# Patient Record
Sex: FEMALE | Race: WHITE | Employment: FULL TIME | URBAN - METROPOLITAN AREA
[De-identification: names, ages, dates, MRNs, and addresses within clinical notes are randomized per-mention and may not be internally consistent; named-entity substitution may affect disease eponyms.]

---

## 2020-03-25 ENCOUNTER — HOSPITAL ENCOUNTER (OUTPATIENT)
Age: 40
Discharge: HOME | End: 2020-03-27
Payer: MEDICAID

## 2020-03-25 ENCOUNTER — TELEPHONE (OUTPATIENT)
Dept: PRIMARY CARE CLINIC | Age: 40
End: 2020-03-25

## 2020-03-25 ENCOUNTER — HOSPITAL ENCOUNTER (OUTPATIENT)
Dept: GENERAL RADIOLOGY | Age: 40
Discharge: HOME | End: 2020-03-27
Payer: MEDICAID

## 2020-03-25 ENCOUNTER — NURSE TRIAGE (OUTPATIENT)
Dept: OTHER | Facility: CLINIC | Age: 40
End: 2020-03-25

## 2020-03-25 ENCOUNTER — TELEPHONE (OUTPATIENT)
Dept: ADMINISTRATIVE | Age: 40
End: 2020-03-25

## 2020-03-25 ENCOUNTER — OFFICE VISIT (OUTPATIENT)
Dept: PRIMARY CARE CLINIC | Age: 40
End: 2020-03-25
Payer: MEDICAID

## 2020-03-25 VITALS
HEIGHT: 66 IN | DIASTOLIC BLOOD PRESSURE: 70 MMHG | BODY MASS INDEX: 32.95 KG/M2 | WEIGHT: 205 LBS | SYSTOLIC BLOOD PRESSURE: 110 MMHG | TEMPERATURE: 98.9 F | RESPIRATION RATE: 18 BRPM | OXYGEN SATURATION: 97 % | HEART RATE: 98 BPM

## 2020-03-25 LAB
INFLUENZA A ANTIGEN, POC: NORMAL
INFLUENZA B ANTIGEN, POC: NORMAL

## 2020-03-25 PROCEDURE — 87804 INFLUENZA ASSAY W/OPTIC: CPT | Performed by: PHYSICIAN ASSISTANT

## 2020-03-25 PROCEDURE — 99213 OFFICE O/P EST LOW 20 MIN: CPT | Performed by: PHYSICIAN ASSISTANT

## 2020-03-25 PROCEDURE — 71046 X-RAY EXAM CHEST 2 VIEWS: CPT

## 2020-03-25 RX ORDER — AZITHROMYCIN 250 MG/1
250 TABLET, FILM COATED ORAL SEE ADMIN INSTRUCTIONS
Qty: 6 TABLET | Refills: 0 | Status: SHIPPED | OUTPATIENT
Start: 2020-03-25 | End: 2020-03-30

## 2020-03-25 RX ORDER — ALBUTEROL SULFATE 90 UG/1
2 AEROSOL, METERED RESPIRATORY (INHALATION) EVERY 6 HOURS PRN
Qty: 1 INHALER | Refills: 3 | Status: SHIPPED | OUTPATIENT
Start: 2020-03-25

## 2020-03-25 RX ORDER — METFORMIN HYDROCHLORIDE 750 MG/1
TABLET, EXTENDED RELEASE ORAL
COMMUNITY
Start: 2020-03-02 | End: 2022-08-28

## 2020-03-25 RX ORDER — PREDNISONE 20 MG/1
40 TABLET ORAL DAILY
Qty: 10 TABLET | Refills: 0 | Status: SHIPPED | OUTPATIENT
Start: 2020-03-25 | End: 2020-03-30

## 2020-03-25 RX ORDER — VARENICLINE TARTRATE 1 MG/1
TABLET, FILM COATED ORAL
COMMUNITY
Start: 2020-02-28

## 2020-03-25 NOTE — LETTER
[address] [place] [address] [facility] [address]  Phone: [phone]  Fax: [phone]    Alyse La        March 25, 2020     Patient: Diamond Alvarez   YOB: 1980   Date of Visit: 3/25/2020       To Whom It May Concern: It is my medical opinion that Diamond Alvarez needs to be self quarantined for 14 days due to current illness. .    If you have any questions or concerns, please don't hesitate to call.     Sincerely,        Aysha Rodriguez PA-C

## 2020-03-25 NOTE — PATIENT INSTRUCTIONS
Patient Education        Cough: Care Instructions  Your Care Instructions    A cough is your body's response to something that bothers your throat or airways. Many things can cause a cough. You might cough because of a cold or the flu, bronchitis, or asthma. Smoking, postnasal drip, allergies, and stomach acid that backs up into your throat also can cause coughs. A cough is a symptom, not a disease. Most coughs stop when the cause, such as a cold, goes away. You can take a few steps at home to cough less and feel better. Follow-up care is a key part of your treatment and safety. Be sure to make and go to all appointments, and call your doctor if you are having problems. It's also a good idea to know your test results and keep a list of the medicines you take. How can you care for yourself at home? · Drink lots of water and other fluids. This helps thin the mucus and soothes a dry or sore throat. Honey or lemon juice in hot water or tea may ease a dry cough. · Take cough medicine as directed by your doctor. · Prop up your head on pillows to help you breathe and ease a dry cough. · Try cough drops to soothe a dry or sore throat. Cough drops don't stop a cough. Medicine-flavored cough drops are no better than candy-flavored drops or hard candy. · Do not smoke. Avoid secondhand smoke. If you need help quitting, talk to your doctor about stop-smoking programs and medicines. These can increase your chances of quitting for good. When should you call for help? Call 911 anytime you think you may need emergency care.  For example, call if:    · You have severe trouble breathing.    Call your doctor now or seek immediate medical care if:    · You cough up blood.     · You have new or worse trouble breathing.     · You have a new or higher fever.     · You have a new rash.    Watch closely for changes in your health, and be sure to contact your doctor if:    · You cough more deeply or more often, especially if you notice more mucus or a change in the color of your mucus.     · You have new symptoms, such as a sore throat, an earache, or sinus pain.     · You do not get better as expected. Where can you learn more? Go to https://chpekinjal.[employer]. org and sign in to your Startupe[employer] account. Enter D279 in the [facility] box to learn more about \"Cough: Care Instructions. \"     If you do not have an account, please click on the \"Sign Up Now\" link. Current as of: June 9, 2019Content Version: 12.4  © 1599-1319 Healthwise, Incorporated. Care instructions adapted under license by [facility] ([facility]). If you have questions about a medical condition or this instruction, always ask your healthcare professional. [address] any warranty or liability for your use of this information. The results and recommendations made for you today reflect your status at this time. Your condition is subject to change. Should your symptoms worsen, please seek additional medical attention. Preventing the Spread of Coronavirus Disease 2019 in Homes and Residential Communities   For the most recent information go to IndyGeek.fi    Prevention steps for People with confirmed or suspected COVID-19 (including persons under investigation) who do not need to be hospitalized  and   People with confirmed COVID-19 who were hospitalized and determined to be medically stable to go home    Your healthcare provider and public health staff will evaluate whether you can be cared for at home. If it is determined that you do not need to be hospitalized and can be isolated at home, you will be monitored by staff from your local or state health department. You should follow the prevention steps below until a healthcare provider or local or state health department says you can return to your normal activities.   Stay home except to get medical care  People who are mildly ill with COVID-19 are able to isolate at home during their illness. You should restrict activities outside your home, except for getting medical care. Do not go to work, school, or public areas. Avoid using public transportation, ride-sharing, or taxis. Separate yourself from other people and animals in your home  People: As much as possible, you should stay in a specific room and away from other people in your home. Also, you should use a separate bathroom, if available. Animals: You should restrict contact with pets and other animals while you are sick with COVID-19, just like you would around other people. Although there have not been reports of pets or other animals becoming sick with COVID-19, it is still recommended that people sick with COVID-19 limit contact with animals until more information is known about the virus. When possible, have another member of your household care for your animals while you are sick. If you are sick with COVID-19, avoid contact with your pet, including petting, snuggling, being kissed or licked, and sharing food. If you must care for your pet or be around animals while you are sick, wash your hands before and after you interact with pets and wear a facemask. Call ahead before visiting your doctor  If you have a medical appointment, call the healthcare provider and tell them that you have or may have COVID-19. This will help the healthcare providers office take steps to keep other people from getting infected or exposed. Wear a facemask  You should wear a facemask when you are around other people (e.g., sharing a room or vehicle) or pets and before you enter a healthcare providers office. If you are not able to wear a facemask (for example, because it causes trouble breathing), then people who live with you should not stay in the same room with you, or they should wear a facemask if they enter your room.   Cover your coughs and sneezes  Cover your mouth and nose with a tissue when you cough or sneeze. Throw used tissues in a lined trash can. Immediately wash your hands with soap and water for at least 20 seconds or, if soap and water are not available, clean your hands with an alcohol-based hand  that contains at least 60% alcohol. Clean your hands often  Wash your hands often with soap and water for at least 20 seconds, especially after blowing your nose, coughing, or sneezing; going to the bathroom; and before eating or preparing food. If soap and water are not readily available, use an alcohol-based hand  with at least 60% alcohol, covering all surfaces of your hands and rubbing them together until they feel dry. Soap and water are the best option if hands are visibly dirty. Avoid touching your eyes, nose, and mouth with unwashed hands. Avoid sharing personal household items  You should not share dishes, drinking glasses, cups, eating utensils, towels, or bedding with other people or pets in your home. After using these items, they should be washed thoroughly with soap and water. Clean all high-touch surfaces everyday  High touch surfaces include counters, tabletops, doorknobs, bathroom fixtures, toilets, phones, keyboards, tablets, and bedside tables. Also, clean any surfaces that may have blood, stool, or body fluids on them. Use a household cleaning spray or wipe, according to the label instructions. Labels contain instructions for safe and effective use of the cleaning product including precautions you should take when applying the product, such as wearing gloves and making sure you have good ventilation during use of the product. Monitor your symptoms  Seek prompt medical attention if your illness is worsening (e.g., difficulty breathing). Before seeking care, call your healthcare provider and tell them that you have, or are being evaluated for, COVID-19. Put on a facemask before you enter the facility.  These steps will help the healthcare providers office to keep other people in the office or waiting room from getting infected or exposed. Ask your healthcare provider to call the local or state health department. Persons who are placed under active monitoring or facilitated self-monitoring should follow instructions provided by their local health department or occupational health professionals, as appropriate. When working with your local health department check their available hours. If you have a medical emergency and need to call 911, notify the dispatch personnel that you have, or are being evaluated for COVID-19. If possible, put on a facemask before emergency medical services arrive. Discontinuing home isolation  Patients with confirmed COVID-19 should remain under home isolation precautions until the risk of secondary transmission to others is thought to be low. The decision to discontinue home isolation precautions should be made on a case-by-case basis, in consultation with healthcare providers and state and [facility] health departments. Patient Education        Reactive Airway Disease: Care Instructions  Your Care Instructions    Reactive airway disease is a breathing problem that appears as wheezing, a whistling noise in your airways. It may be caused by a viral or bacterial infection, allergies, tobacco smoke, or something else in the environment. When you are around these triggers, your body releases chemicals that make the airways get tight. Reactive airway disease is a lot like asthma. Both can cause wheezing. But asthma is ongoing, while reactive airway disease may occur only now and then. Tests can be done to tell whether you have asthma. You may take the same medicines used to treat asthma. Good home care and follow-up care with your doctor can help you recover. Follow-up care is a key part of your treatment and safety. Be sure to make and go to all appointments, and call your doctor if you are having problems.  It's also a good idea to know your test results and keep a list of the medicines you take. How can you care for yourself at home? · Take your medicines exactly as prescribed. Call your doctor if you think you are having a problem with your medicine. · Do not smoke or allow others to smoke around you. If you need help quitting, talk to your doctor about stop-smoking programs and medicines. These can increase your chances of quitting for good. · If you know what caused your wheezing (such as perfume or the odor of household chemicals), try to avoid it in the future. · Wash your hands several times a day, and consider using hand gels or wipes that contain alcohol. This can prevent colds and other infections. When should you call for help? Call 911 anytime you think you may need emergency care. For example, call if:    · You have severe trouble breathing.    Watch closely for changes in your health, and be sure to contact your doctor if:    · You cough up yellow, dark brown, or bloody mucus.     · You have a fever.     · Your wheezing gets worse. Where can you learn more? Go to https://Wo[employer] BrospeMobstats.Skyword. org and sign in to your C[employer]ogen account. Enter P438 in the [facility] box to learn more about \"Reactive Airway Disease: Care Instructions. \"     If you do not have an account, please click on the \"Sign Up Now\" link. Current as of: June 9, 2019Content Version: 12.4  © 5862-6712 Healthwise, Incorporated. Care instructions adapted under license by [facility] ([facility]). If you have questions about a medical condition or this instruction, always ask your healthcare professional. [place] [address] any warranty or liability for your use of this information.

## 2020-03-25 NOTE — TELEPHONE ENCOUNTER
Reason for Disposition   Continuous (nonstop) coughing interferes with work or school and no improvement using cough treatment per Care Advice    Answer Assessment - Initial Assessment Questions  1. ONSET: \"When did the cough begin? \"       Started Sunday night - has intermittent coughing spells - refers to as [place] and dry cough\"      2. SEVERITY: \"How bad is the cough today? \"       Brenda Bailey her from sleep last night     3. RESPIRATORY DISTRESS: \"Describe your breathing. \"       Feels pain with deep inspiration in middle of the chest - had a small coughing spell during call and states pain really gets bad after coughing. 4. FEVER: \"Do you have a fever? \" If so, ask: \"What is your temperature, how was it measured, and when did it start? \"      Took temp yesterday but cannot find thermometer at time of calls. Feels chilled now. 5. HEMOPTYSIS: \"Are you coughing up any blood? \" If so ask: \"How much? \" (flecks, streaks, tablespoons, etc.)      Denies    6. TREATMENT: \"What have you done so far to treat the cough? \" (e.g., meds, fluids, humidifier)     Nothing - very cautious of cough medication due to diabetes - BS at time of call was 111- fasting - has not had anything to eat or drink yet today    7. CARDIAC HISTORY: \"Do you have any history of heart disease? \" (e.g., heart attack, congestive heart failure)       Denies    8. LUNG HISTORY: \"Do you have any history of lung disease? \"  (e.g., pulmonary embolus, asthma, emphysema)      Denies    9. PE RISK FACTORS: \"Do you have a history of blood clots? \" (or: recent major surgery, recent prolonged travel, bedridden)      Denies    10. OTHER SYMPTOMS: \"Do you have any other symptoms? (e.g., runny nose, wheezing, chest pain)        Headache - rates as 5-6/10 - \"dull and nagging headache\"  Denies nausea or vomiting. Diarrhea 4 times in past 24 hours. Has muscle aches and fatigue. 11. PREGNANCY: \"Is there any chance you are pregnant? \" \"When was your last menstrual

## 2020-03-25 NOTE — PROGRESS NOTES
Amara Lazo  1980    Chief Complaint   Patient presents with    Cough    Shortness of Breath    Headache    Chills    Fatigue       Respiratory Symptoms:  Patient complains of 4 day(s) history of myalgias/arthralgias, headache, fatigue, shortness of breath, wheezing/chest tightness, non-productive cough and diarrhea. Symptoms have been worsening with time. She denies nasal congestion and sneezing. She is a diabetic and takes Glucophage. She reports a history of \"bronchitis\" usually at least once per year. She is a homemaker, she   Is unsure of sick exposures. She has had severe body aches and chills and severe fatigue and wanting to \"sleep all the time\". She denies any chest pain, but has had some SOB with activities. Relevant PMH: DM.     Smoking history:  She  reports that she has been smoking cigarettes. She has been smoking about 1.00 pack per day. She has never used smokeless tobacco.     She has had no known ill contacts. Treatment to date: Acetaminophen. Travel screen completed:  Yes          Vitals:    03/25/20 1419 03/25/20 1429   BP: 110/70    Pulse: 103 98   Resp: 18    Temp: 98.9 °F (37.2 °C)    SpO2: 98% 97%   Weight: 205 lb (93 kg)    Height: 5' 6.25\" (1.683 m)       Physical Exam  Vitals signs and nursing note reviewed. Constitutional:       General: She is not in acute distress. Appearance: Normal appearance. She is well-developed. She is ill-appearing. She is not toxic-appearing or diaphoretic. HENT:      Head: Normocephalic and atraumatic. Right Ear: Ear canal and external ear normal.      Left Ear: Ear canal and external ear normal.      Ears:      Comments: TM's dull bilaterally, no significant erythema       Nose: Congestion present. Comments: Turbinates swollen with some erythema noted bilaterally       Mouth/Throat:      Mouth: Mucous membranes are moist.      Pharynx: Posterior oropharyngeal erythema present.    Eyes:      Extraocular Movements: rapid flu swab negative. Recommend 2 view CXR. Will call with results. Patient advised will most likely recommend Zithromax, Prednisone burst as well as inhaler. Patient does not like cough medication. She lists Proventil as allergy, but patient reports she was a little girl and has had albuterol as she has a history of bronchitis at least once yearly. Recommend to touch base with her PCP, caution with close family members at home, basic COVID precautions. Recommend fluids, rest, tylenol for body aches and fever, avoid NSAIDs. Discussed worsening signs to look for. Will call with CXR results and send in Rx's once reviewed. Patient is aware that with her current symptoms, COVID 19 is a good possibility, but at this time no need for emergent evaluation or hospitalization. Vibha Santos PA-C  3/25/20     This visit was provided as a focused evaluation during the COVID -19 pandemic/national emergency. A comprehensive review of all previous patient history and testing was not conducted. Pertinent findings were elicited during the visit.

## 2020-03-25 NOTE — TELEPHONE ENCOUNTER
Pt having dry cough, hurts chest from cough, diarrhea, after coughing sob, she is diabetes, a lot of sneezing, chilled off and on and no fever. Also she has a headache. I will call triage nurse, on hold for 4 min. I left message for triage nurse. Patient will go to [address] [place] [address] office to get checked.

## 2020-08-12 ENCOUNTER — HOSPITAL ENCOUNTER (EMERGENCY)
Age: 40
Discharge: HOME | End: 2020-08-12
Payer: MEDICAID

## 2020-08-12 ENCOUNTER — APPOINTMENT (OUTPATIENT)
Dept: CT IMAGING | Age: 40
End: 2020-08-12
Payer: MEDICAID

## 2020-08-12 VITALS
HEIGHT: 66 IN | HEART RATE: 87 BPM | TEMPERATURE: 98.6 F | SYSTOLIC BLOOD PRESSURE: 107 MMHG | OXYGEN SATURATION: 94 % | RESPIRATION RATE: 22 BRPM | DIASTOLIC BLOOD PRESSURE: 72 MMHG | BODY MASS INDEX: 33.11 KG/M2 | WEIGHT: 206 LBS

## 2020-08-12 LAB
ANION GAP SERPL CALCULATED.3IONS-SCNC: 13 MMOL/L (ref 7–16)
BASOPHILS ABSOLUTE: 0.05 E9/L (ref 0–0.2)
BASOPHILS RELATIVE PERCENT: 0.7 % (ref 0–2)
BILIRUBIN URINE: NEGATIVE
BLOOD, URINE: NEGATIVE
BUN BLDV-MCNC: 9 MG/DL (ref 6–20)
CALCIUM SERPL-MCNC: 9.3 MG/DL (ref 8.6–10.2)
CHLORIDE BLD-SCNC: 102 MMOL/L (ref 98–107)
CLARITY: CLEAR
CO2: 23 MMOL/L (ref 22–29)
COLOR: NORMAL
CREAT SERPL-MCNC: 0.9 MG/DL (ref 0.5–1)
EOSINOPHILS ABSOLUTE: 0.25 E9/L (ref 0.05–0.5)
EOSINOPHILS RELATIVE PERCENT: 3.5 % (ref 0–6)
GFR AFRICAN AMERICAN: >60
GFR NON-AFRICAN AMERICAN: >60 ML/MIN/1.73
GLUCOSE BLD-MCNC: 152 MG/DL (ref 74–99)
GLUCOSE URINE: NEGATIVE MG/DL
HCT VFR BLD CALC: 47.9 % (ref 34–48)
HEMOGLOBIN: 16 G/DL (ref 11.5–15.5)
IMMATURE GRANULOCYTES #: 0.02 E9/L
IMMATURE GRANULOCYTES %: 0.3 % (ref 0–5)
KETONES, URINE: NEGATIVE MG/DL
LEUKOCYTE ESTERASE, URINE: NEGATIVE
LYMPHOCYTES ABSOLUTE: 2.59 E9/L (ref 1.5–4)
LYMPHOCYTES RELATIVE PERCENT: 36.2 % (ref 20–42)
MCH RBC QN AUTO: 30.4 PG (ref 26–35)
MCHC RBC AUTO-ENTMCNC: 33.4 % (ref 32–34.5)
MCV RBC AUTO: 91.1 FL (ref 80–99.9)
MONOCYTES ABSOLUTE: 0.55 E9/L (ref 0.1–0.95)
MONOCYTES RELATIVE PERCENT: 7.7 % (ref 2–12)
NEUTROPHILS ABSOLUTE: 3.7 E9/L (ref 1.8–7.3)
NEUTROPHILS RELATIVE PERCENT: 51.6 % (ref 43–80)
NITRITE, URINE: NEGATIVE
PDW BLD-RTO: 12.8 FL (ref 11.5–15)
PH UA: 5.5 (ref 5–9)
PLATELET # BLD: 319 E9/L (ref 130–450)
PMV BLD AUTO: 10 FL (ref 7–12)
POTASSIUM SERPL-SCNC: 3.9 MMOL/L (ref 3.5–5)
PROTEIN UA: NEGATIVE MG/DL
RBC # BLD: 5.26 E12/L (ref 3.5–5.5)
SODIUM BLD-SCNC: 138 MMOL/L (ref 132–146)
SPECIFIC GRAVITY UA: <=1.005 (ref 1–1.03)
UROBILINOGEN, URINE: 0.2 E.U./DL
WBC # BLD: 7.2 E9/L (ref 4.5–11.5)

## 2020-08-12 PROCEDURE — 81003 URINALYSIS AUTO W/O SCOPE: CPT

## 2020-08-12 PROCEDURE — 96374 THER/PROPH/DIAG INJ IV PUSH: CPT

## 2020-08-12 PROCEDURE — 99282 EMERGENCY DEPT VISIT SF MDM: CPT

## 2020-08-12 PROCEDURE — 2580000003 HC RX 258: Performed by: PHYSICIAN ASSISTANT

## 2020-08-12 PROCEDURE — 96375 TX/PRO/DX INJ NEW DRUG ADDON: CPT

## 2020-08-12 PROCEDURE — 99283 EMERGENCY DEPT VISIT LOW MDM: CPT

## 2020-08-12 PROCEDURE — 6370000000 HC RX 637 (ALT 250 FOR IP): Performed by: PHYSICIAN ASSISTANT

## 2020-08-12 PROCEDURE — 6360000002 HC RX W HCPCS: Performed by: PHYSICIAN ASSISTANT

## 2020-08-12 PROCEDURE — 80048 BASIC METABOLIC PNL TOTAL CA: CPT

## 2020-08-12 PROCEDURE — 74176 CT ABD & PELVIS W/O CONTRAST: CPT

## 2020-08-12 PROCEDURE — 85025 COMPLETE CBC W/AUTO DIFF WBC: CPT

## 2020-08-12 RX ORDER — ONDANSETRON 2 MG/ML
4 INJECTION INTRAMUSCULAR; INTRAVENOUS ONCE
Status: COMPLETED | OUTPATIENT
Start: 2020-08-12 | End: 2020-08-12

## 2020-08-12 RX ORDER — HYDROCODONE BITARTRATE AND ACETAMINOPHEN 5; 325 MG/1; MG/1
1 TABLET ORAL ONCE
Status: COMPLETED | OUTPATIENT
Start: 2020-08-12 | End: 2020-08-12

## 2020-08-12 RX ORDER — HYDROCODONE BITARTRATE AND ACETAMINOPHEN 5; 325 MG/1; MG/1
1 TABLET ORAL EVERY 6 HOURS PRN
Qty: 12 TABLET | Refills: 0 | Status: SHIPPED | OUTPATIENT
Start: 2020-08-12 | End: 2020-08-15

## 2020-08-12 RX ORDER — 0.9 % SODIUM CHLORIDE 0.9 %
1000 INTRAVENOUS SOLUTION INTRAVENOUS ONCE
Status: COMPLETED | OUTPATIENT
Start: 2020-08-12 | End: 2020-08-12

## 2020-08-12 RX ORDER — KETOROLAC TROMETHAMINE 15 MG/ML
15 INJECTION, SOLUTION INTRAMUSCULAR; INTRAVENOUS ONCE
Status: COMPLETED | OUTPATIENT
Start: 2020-08-12 | End: 2020-08-12

## 2020-08-12 RX ADMIN — SODIUM CHLORIDE 1000 ML: 9 INJECTION, SOLUTION INTRAVENOUS at 19:56

## 2020-08-12 RX ADMIN — HYDROCODONE BITARTRATE AND ACETAMINOPHEN 1 TABLET: 5; 325 TABLET ORAL at 21:59

## 2020-08-12 RX ADMIN — ONDANSETRON 4 MG: 2 INJECTION INTRAMUSCULAR; INTRAVENOUS at 19:57

## 2020-08-12 RX ADMIN — KETOROLAC TROMETHAMINE 15 MG: 15 INJECTION, SOLUTION INTRAMUSCULAR; INTRAVENOUS at 19:57

## 2020-08-12 ASSESSMENT — PAIN DESCRIPTION - ORIENTATION: ORIENTATION: LEFT

## 2020-08-12 ASSESSMENT — PAIN SCALES - GENERAL
PAINLEVEL_OUTOF10: 8
PAINLEVEL_OUTOF10: 9
PAINLEVEL_OUTOF10: 10

## 2020-08-12 ASSESSMENT — PAIN DESCRIPTION - PAIN TYPE: TYPE: ACUTE PAIN

## 2020-08-12 ASSESSMENT — PAIN DESCRIPTION - LOCATION: LOCATION: BACK

## 2020-08-27 LAB
ALBUMIN SERPL-MCNC: NORMAL G/DL
ALP BLD-CCNC: NORMAL U/L
ALT SERPL-CCNC: NORMAL U/L
ANION GAP SERPL CALCULATED.3IONS-SCNC: NORMAL MMOL/L
AST SERPL-CCNC: NORMAL U/L
BILIRUB SERPL-MCNC: NORMAL MG/DL
BUN BLDV-MCNC: NORMAL MG/DL
CALCIUM SERPL-MCNC: NORMAL MG/DL
CHLORIDE BLD-SCNC: NORMAL MMOL/L
CHOLESTEROL, TOTAL: NORMAL
CHOLESTEROL/HDL RATIO: NORMAL
CO2: NORMAL
CREAT SERPL-MCNC: NORMAL MG/DL
GFR CALCULATED: NORMAL
GLUCOSE BLD-MCNC: NORMAL MG/DL
HDLC SERPL-MCNC: NORMAL MG/DL
LDL CHOLESTEROL CALCULATED: NORMAL
NONHDLC SERPL-MCNC: NORMAL MG/DL
POTASSIUM SERPL-SCNC: NORMAL MMOL/L
SODIUM BLD-SCNC: NORMAL MMOL/L
TOTAL PROTEIN: NORMAL
TRIGL SERPL-MCNC: NORMAL MG/DL
VITAMIN D 25-HYDROXY: NORMAL
VITAMIN D2, 25 HYDROXY: NORMAL
VITAMIN D3,25 HYDROXY: NORMAL
VLDLC SERPL CALC-MCNC: NORMAL MG/DL

## 2021-04-07 LAB
AVERAGE GLUCOSE: NORMAL
HBA1C MFR BLD: 6.8 %

## 2021-07-15 LAB
AVERAGE GLUCOSE: NORMAL
HBA1C MFR BLD: 7 %

## 2021-11-09 ENCOUNTER — HOSPITAL ENCOUNTER (EMERGENCY)
Age: 41
Discharge: HOME | End: 2021-11-10
Attending: EMERGENCY MEDICINE
Payer: MEDICAID

## 2021-11-09 ENCOUNTER — APPOINTMENT (OUTPATIENT)
Dept: CT IMAGING | Age: 41
End: 2021-11-09
Payer: MEDICAID

## 2021-11-09 ENCOUNTER — APPOINTMENT (OUTPATIENT)
Dept: GENERAL RADIOLOGY | Age: 41
End: 2021-11-09
Payer: MEDICAID

## 2021-11-09 VITALS
TEMPERATURE: 98 F | DIASTOLIC BLOOD PRESSURE: 58 MMHG | RESPIRATION RATE: 19 BRPM | SYSTOLIC BLOOD PRESSURE: 102 MMHG | HEART RATE: 97 BPM | OXYGEN SATURATION: 95 %

## 2021-11-09 DIAGNOSIS — R19.7 DIARRHEA, UNSPECIFIED TYPE: ICD-10-CM

## 2021-11-09 DIAGNOSIS — R55 SYNCOPE AND COLLAPSE: Primary | ICD-10-CM

## 2021-11-09 DIAGNOSIS — R11.2 NON-INTRACTABLE VOMITING WITH NAUSEA, UNSPECIFIED VOMITING TYPE: ICD-10-CM

## 2021-11-09 LAB
ALBUMIN SERPL-MCNC: 4.2 G/DL (ref 3.5–5.2)
ALP BLD-CCNC: 120 U/L (ref 35–104)
ALT SERPL-CCNC: 31 U/L (ref 0–32)
ANION GAP SERPL CALCULATED.3IONS-SCNC: 12 MMOL/L (ref 7–16)
AST SERPL-CCNC: 20 U/L (ref 0–31)
BASOPHILS ABSOLUTE: 0.05 E9/L (ref 0–0.2)
BASOPHILS RELATIVE PERCENT: 0.3 % (ref 0–2)
BILIRUB SERPL-MCNC: 0.3 MG/DL (ref 0–1.2)
BUN BLDV-MCNC: 17 MG/DL (ref 6–20)
CALCIUM SERPL-MCNC: 9.1 MG/DL (ref 8.6–10.2)
CHLORIDE BLD-SCNC: 102 MMOL/L (ref 98–107)
CO2: 21 MMOL/L (ref 22–29)
CREAT SERPL-MCNC: 1.2 MG/DL (ref 0.5–1)
D DIMER: 248 NG/ML DDU
EOSINOPHILS ABSOLUTE: 0.17 E9/L (ref 0.05–0.5)
EOSINOPHILS RELATIVE PERCENT: 1 % (ref 0–6)
GFR AFRICAN AMERICAN: >60
GFR NON-AFRICAN AMERICAN: 50 ML/MIN/1.73
GLUCOSE BLD-MCNC: 153 MG/DL (ref 74–99)
HCT VFR BLD CALC: 46.2 % (ref 34–48)
HEMOGLOBIN: 15.7 G/DL (ref 11.5–15.5)
IMMATURE GRANULOCYTES #: 0.12 E9/L
IMMATURE GRANULOCYTES %: 0.7 % (ref 0–5)
LIPASE: 23 U/L (ref 13–60)
LYMPHOCYTES ABSOLUTE: 1.9 E9/L (ref 1.5–4)
LYMPHOCYTES RELATIVE PERCENT: 11.4 % (ref 20–42)
MCH RBC QN AUTO: 30.1 PG (ref 26–35)
MCHC RBC AUTO-ENTMCNC: 34 % (ref 32–34.5)
MCV RBC AUTO: 88.7 FL (ref 80–99.9)
METER GLUCOSE: 130 MG/DL (ref 74–99)
MONOCYTES ABSOLUTE: 1 E9/L (ref 0.1–0.95)
MONOCYTES RELATIVE PERCENT: 6 % (ref 2–12)
NEUTROPHILS ABSOLUTE: 13.47 E9/L (ref 1.8–7.3)
NEUTROPHILS RELATIVE PERCENT: 80.6 % (ref 43–80)
PDW BLD-RTO: 13.4 FL (ref 11.5–15)
PLATELET # BLD: 326 E9/L (ref 130–450)
PMV BLD AUTO: 9.8 FL (ref 7–12)
POTASSIUM SERPL-SCNC: 3.5 MMOL/L (ref 3.5–5)
RBC # BLD: 5.21 E12/L (ref 3.5–5.5)
SODIUM BLD-SCNC: 135 MMOL/L (ref 132–146)
TOTAL PROTEIN: 7.8 G/DL (ref 6.4–8.3)
TROPONIN, HIGH SENSITIVITY: <6 NG/L (ref 0–9)
WBC # BLD: 16.7 E9/L (ref 4.5–11.5)

## 2021-11-09 PROCEDURE — 85025 COMPLETE CBC W/AUTO DIFF WBC: CPT

## 2021-11-09 PROCEDURE — 84484 ASSAY OF TROPONIN QUANT: CPT

## 2021-11-09 PROCEDURE — 71045 X-RAY EXAM CHEST 1 VIEW: CPT

## 2021-11-09 PROCEDURE — 72125 CT NECK SPINE W/O DYE: CPT

## 2021-11-09 PROCEDURE — 83690 ASSAY OF LIPASE: CPT

## 2021-11-09 PROCEDURE — 2580000003 HC RX 258: Performed by: EMERGENCY MEDICINE

## 2021-11-09 PROCEDURE — 6360000004 HC RX CONTRAST MEDICATION: Performed by: RADIOLOGY

## 2021-11-09 PROCEDURE — 74177 CT ABD & PELVIS W/CONTRAST: CPT

## 2021-11-09 PROCEDURE — 93005 ELECTROCARDIOGRAM TRACING: CPT | Performed by: EMERGENCY MEDICINE

## 2021-11-09 PROCEDURE — 80053 COMPREHEN METABOLIC PANEL: CPT

## 2021-11-09 PROCEDURE — 71275 CT ANGIOGRAPHY CHEST: CPT

## 2021-11-09 PROCEDURE — 82962 GLUCOSE BLOOD TEST: CPT

## 2021-11-09 PROCEDURE — 85378 FIBRIN DEGRADE SEMIQUANT: CPT

## 2021-11-09 PROCEDURE — 70486 CT MAXILLOFACIAL W/O DYE: CPT

## 2021-11-09 PROCEDURE — 70450 CT HEAD/BRAIN W/O DYE: CPT

## 2021-11-09 PROCEDURE — 73030 X-RAY EXAM OF SHOULDER: CPT

## 2021-11-09 PROCEDURE — 99283 EMERGENCY DEPT VISIT LOW MDM: CPT

## 2021-11-09 RX ORDER — 0.9 % SODIUM CHLORIDE 0.9 %
1000 INTRAVENOUS SOLUTION INTRAVENOUS ONCE
Status: DISCONTINUED | OUTPATIENT
Start: 2021-11-09 | End: 2021-11-10 | Stop reason: HOSPADM

## 2021-11-09 RX ADMIN — IOPAMIDOL 75 ML: 755 INJECTION, SOLUTION INTRAVENOUS at 22:46

## 2021-11-09 ASSESSMENT — ENCOUNTER SYMPTOMS
NAUSEA: 1
EYE REDNESS: 0
EYE PAIN: 0
VOMITING: 1
BACK PAIN: 0
SORE THROAT: 0
WHEEZING: 0
EYE DISCHARGE: 0
COUGH: 0
SINUS PRESSURE: 0
SHORTNESS OF BREATH: 0
ABDOMINAL DISTENTION: 0
DIARRHEA: 1

## 2021-11-10 NOTE — ED NOTES
Bed: 17  Expected date:   Expected time:   Means of arrival:   Comments:     MATI Dow  11/09/21 2038

## 2021-11-10 NOTE — ED NOTES
Pt presents to ED by ems with reports of the patient having a syncopal episode in the bathroom. Pt states that she has been having diarrhea and vomiting. Pt is a diabetic BS was 130 upon arrival. Pt complains of neck and back pain. Pt was hypotensive per ems. Fluids initiated. Pt's pupils are equal and reactive. Pt does appear to be drowsy but answers questions appropriately.  PT has a c-collar on upon arrival.      Charlene [place], [place]  11/09/21 2927

## 2021-11-10 NOTE — ED PROVIDER NOTES
Patient is a 36 y/o female who presents to the ED via EMS. Patient states that she has had diarrhea all day today. She states that tonight she remembers telling her  not to put anything on the toilet because she might need it. She reports nausea, vomiting and diarrhea. She subsequently became dizzy and passed out. She did hit her head. Currently, she reports a headache, dental pain, neck pain and left shoulder pain. She denies any chest pain, palpitations or shortness of breath. She denies any fever. Review of Systems   Constitutional: Negative for chills and fever. HENT: Negative for ear pain, sinus pressure and sore throat. Eyes: Negative for pain, discharge and redness. Respiratory: Negative for cough, shortness of breath and wheezing. Cardiovascular: Negative for chest pain. Gastrointestinal: Positive for diarrhea, nausea and vomiting. Negative for abdominal distention. Genitourinary: Negative for dysuria and frequency. Musculoskeletal: Positive for arthralgias and neck pain. Negative for back pain. Skin: Negative for rash and wound. Neurological: Positive for dizziness, syncope and headaches. Negative for weakness. Hematological: Negative for adenopathy. All other systems reviewed and are negative. Physical Exam  Vitals and nursing note reviewed. Constitutional:       General: She is not in acute distress. HENT:      Head: Normocephalic. Right Ear: External ear normal.      Left Ear: External ear normal.      Nose: Nose normal.      Mouth/Throat:      Mouth: Mucous membranes are moist.      Comments: Superficial laceration lower lip. Eyes:      Conjunctiva/sclera: Conjunctivae normal.      Pupils: Pupils are equal, round, and reactive to light. Neck:      Comments: Cervical collar present. Positive midline tenderness to palpation. Cardiovascular:      Rate and Rhythm: Normal rate and regular rhythm. Heart sounds: No murmur heard.       Pulmonary: Effort: Pulmonary effort is normal. No respiratory distress. Breath sounds: Normal breath sounds. No stridor. No wheezing, rhonchi or rales. Abdominal:      General: Bowel sounds are normal. There is no distension. Palpations: Abdomen is soft. Tenderness: There is no abdominal tenderness. There is no guarding. Musculoskeletal:      Left shoulder: Normal. No swelling, deformity, tenderness, bony tenderness or crepitus. Cervical back: Tenderness present. Skin:     General: Skin is warm and dry. Neurological:      Mental Status: She is alert and oriented to person, place, and time. Procedures     MDM           EKG:  Normal sinus rhythm with ventricular rate of 94. ID interval, QRS duration and QT interval within normal range. Normal axis. No ST segment abnormalities to suggest acute ischemia.         --------------------------------------------- PAST HISTORY ---------------------------------------------  Past Medical History:  has a past medical history of ADHD (attention deficit hyperactivity disorder), Diabetes mellitus ([facility] [address] Headache, and Hypertension. Past Surgical History:  has a past surgical history that includes Hysterectomy; Appendectomy; Cholecystectomy; and  section. Social History:  reports that she has been smoking cigarettes. She has been smoking about 1.00 pack per day. She has never used smokeless tobacco. She reports that she does not drink alcohol and does not use drugs. Family History: family history is not on file. The patients home medications have been reviewed.     Allergies: Proventil [albuterol sulfate]    -------------------------------------------------- RESULTS -------------------------------------------------  Labs:  Results for orders placed or performed during the hospital encounter of 11/09/21   CBC auto differential   Result Value Ref Range    WBC 16.7 (H) 4.5 - 11.5 E9/L    RBC 5.21 3.50 - 5.50 E12/L    Hemoglobin 15.7 (H) 11.5 - 15.5 g/dL    Hematocrit 46.2 34.0 - 48.0 %    MCV 88.7 80.0 - 99.9 fL    MCH 30.1 26.0 - 35.0 pg    MCHC 34.0 32.0 - 34.5 %    RDW 13.4 11.5 - 15.0 fL    Platelets 853 664 - 730 E9/L    MPV 9.8 7.0 - 12.0 fL    Neutrophils % 80.6 (H) 43.0 - 80.0 %    Immature Granulocytes % 0.7 0.0 - 5.0 %    Lymphocytes % 11.4 (L) 20.0 - 42.0 %    Monocytes % 6.0 2.0 - 12.0 %    Eosinophils % 1.0 0.0 - 6.0 %    Basophils % 0.3 0.0 - 2.0 %    Neutrophils Absolute 13.47 (H) 1.80 - 7.30 E9/L    Immature Granulocytes # 0.12 E9/L    Lymphocytes Absolute 1.90 1.50 - 4.00 E9/L    Monocytes Absolute 1.00 (H) 0.10 - 0.95 E9/L    Eosinophils Absolute 0.17 0.05 - 0.50 E9/L    Basophils Absolute 0.05 0.00 - 0.20 E9/L   Comprehensive Metabolic Panel   Result Value Ref Range    Sodium 135 132 - 146 mmol/L    Potassium 3.5 3.5 - 5.0 mmol/L    Chloride 102 98 - 107 mmol/L    CO2 21 (L) 22 - 29 mmol/L    Anion Gap 12 7 - 16 mmol/L    Glucose 153 (H) 74 - 99 mg/dL    BUN 17 6 - 20 mg/dL    CREATININE 1.2 (H) 0.5 - 1.0 mg/dL    GFR Non-African American 50 >=60 mL/min/1.73    GFR African American >60     Calcium 9.1 8.6 - 10.2 mg/dL    Total Protein 7.8 6.4 - 8.3 g/dL    Albumin 4.2 3.5 - 5.2 g/dL    Total Bilirubin 0.3 0.0 - 1.2 mg/dL    Alkaline Phosphatase 120 (H) 35 - 104 U/L    ALT 31 0 - 32 U/L    AST 20 0 - 31 U/L   Troponin   Result Value Ref Range    Troponin, High Sensitivity <6 0 - 9 ng/L   D-Dimer, Quantitative   Result Value Ref Range    D-Dimer, Quant 248 ng/mL DDU   Lipase   Result Value Ref Range    Lipase 23 13 - 60 U/L   POCT Glucose   Result Value Ref Range    Meter Glucose 130 (H) 74 - 99 mg/dL       Radiology:  CTA CHEST W CONTRAST   Final Result   No evidence of pulmonary embolism. Diffuse ground-glass opacification involving the bilateral lungs, this could   be related to air trapping, or may indicate pulmonary edema.          CT ABDOMEN PELVIS W IV CONTRAST Additional Contrast? None   Final Result   Scattered colonic air-fluid levels which may reflect enterocolitis. No   colonic mucosal thickening. 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 Punctate nonobstructive stone in the left kidney measuring approximately 3 mm. Other chronic or incidental findings as noted. CT HEAD WO CONTRAST   Final Result   No acute intracranial abnormality. No acute traumatic injury of the facial bones. No evidence of acute fracture or traumatic malalignment of the cervical spine. CT CERVICAL SPINE WO CONTRAST   Final Result   No acute intracranial abnormality. No acute traumatic injury of the facial bones. No evidence of acute fracture or traumatic malalignment of the cervical spine. CT FACIAL BONES WO CONTRAST   Final Result   No acute intracranial abnormality. No acute traumatic injury of the facial bones. No evidence of acute fracture or traumatic malalignment of the cervical spine. XR SHOULDER LEFT (MIN 2 VIEWS)   Final Result   1. No acute osseous findings seen about the left shoulder or proximal left   humerus. 2.  Perhaps faint central interstitial opacities present in the perihilar   region of the left lung. XR CHEST 1 VIEW   Final Result   Cardiac silhouette appears enlarged. Mild central pulmonary vascular   congestion and perhaps faint central interstitial opacities. (Possibly artifactual due to decreased x-ray penetration. Edema or   potentially infection or not excluded.)      RECOMMENDATION:   (Recommend upright PA and lateral chest radiographs 6-8 weeks after   resolution of patient's symptoms to ensure complete resolution of   radiographic findings. )             ------------------------- NURSING NOTES AND VITALS REVIEWED ---------------------------  Date / Time Roomed:  11/9/2021  8:38 PM  ED Bed Assignment:  17/17    The nursing notes within the ED encounter and vital signs as below have been reviewed. BP (!) 102/58   Pulse 97   Temp 98 °F (36.7 °C)   Resp 19   SpO2 95%   Oxygen Saturation Interpretation: Normal      ------------------------------------------ PROGRESS NOTES ------------------------------------------  I have spoken with the patient and discussed todays results, in addition to providing specific details for the plan of care and counseling regarding the diagnosis and prognosis. Their questions are answered at this time and they are agreeable with the plan. I discussed at length with them reasons for immediate return here for re evaluation. They will followup with primary care by calling their office tomorrow. --------------------------------- ADDITIONAL PROVIDER NOTES ---------------------------------  At this time the patient is without objective evidence of an acute process requiring hospitalization or inpatient management. They have remained hemodynamically stable throughout their entire ED visit and are stable for discharge with outpatient follow-up. The plan has been discussed in detail and they are aware of the specific conditions for emergent return, as well as the importance of follow-up. New Prescriptions    No medications on file       Diagnosis:  1. Syncope and collapse    2. Non-intractable vomiting with nausea, unspecified vomiting type    3. Diarrhea, unspecified type        Disposition:  Patient's disposition: Discharge to home  Patient's condition is stable.          [address] [facility],   11/10/21 0030

## 2021-11-11 LAB
EKG ATRIAL RATE: 94 BPM
EKG P AXIS: 47 DEGREES
EKG P-R INTERVAL: 144 MS
EKG Q-T INTERVAL: 380 MS
EKG QRS DURATION: 86 MS
EKG QTC CALCULATION (BAZETT): 475 MS
EKG R AXIS: 12 DEGREES
EKG T AXIS: 22 DEGREES
EKG VENTRICULAR RATE: 94 BPM

## 2022-05-16 RX ORDER — SITAGLIPTIN 100 MG/1
TABLET, FILM COATED ORAL
Qty: 30 TABLET | Refills: 2 | Status: SHIPPED | OUTPATIENT
Start: 2022-05-16 | End: 2022-06-15

## 2022-06-02 DIAGNOSIS — F41.9 ANXIETY: Primary | ICD-10-CM

## 2022-06-03 RX ORDER — LORAZEPAM 1 MG/1
TABLET ORAL
Qty: 60 TABLET | Refills: 0 | Status: SHIPPED
Start: 2022-06-03 | End: 2022-06-20 | Stop reason: SDUPTHER

## 2022-06-20 ENCOUNTER — OFFICE VISIT (OUTPATIENT)
Dept: PRIMARY CARE CLINIC | Age: 42
End: 2022-06-20
Payer: MEDICAID

## 2022-06-20 VITALS
HEART RATE: 92 BPM | SYSTOLIC BLOOD PRESSURE: 118 MMHG | OXYGEN SATURATION: 97 % | BODY MASS INDEX: 32.62 KG/M2 | WEIGHT: 203 LBS | TEMPERATURE: 97.1 F | HEIGHT: 66 IN | DIASTOLIC BLOOD PRESSURE: 76 MMHG

## 2022-06-20 VITALS
WEIGHT: 204 LBS | HEART RATE: 102 BPM | OXYGEN SATURATION: 97 % | SYSTOLIC BLOOD PRESSURE: 136 MMHG | HEIGHT: 66 IN | BODY MASS INDEX: 32.78 KG/M2 | DIASTOLIC BLOOD PRESSURE: 89 MMHG | TEMPERATURE: 97.5 F

## 2022-06-20 DIAGNOSIS — F41.9 ANXIETY: ICD-10-CM

## 2022-06-20 DIAGNOSIS — J01.31 ACUTE RECURRENT SPHENOIDAL SINUSITIS: Primary | ICD-10-CM

## 2022-06-20 PROCEDURE — 99213 OFFICE O/P EST LOW 20 MIN: CPT | Performed by: FAMILY MEDICINE

## 2022-06-20 RX ORDER — LORAZEPAM 1 MG/1
1 TABLET ORAL 2 TIMES DAILY
Qty: 60 TABLET | Refills: 0 | Status: SHIPPED | OUTPATIENT
Start: 2022-06-20 | End: 2022-07-20

## 2022-06-20 RX ORDER — AZITHROMYCIN 250 MG/1
250 TABLET, FILM COATED ORAL SEE ADMIN INSTRUCTIONS
Qty: 6 TABLET | Refills: 0 | Status: SHIPPED | OUTPATIENT
Start: 2022-06-20 | End: 2022-06-25

## 2022-06-20 SDOH — ECONOMIC STABILITY: FOOD INSECURITY: WITHIN THE PAST 12 MONTHS, THE FOOD YOU BOUGHT JUST DIDN'T LAST AND YOU DIDN'T HAVE MONEY TO GET MORE.: NEVER TRUE

## 2022-06-20 SDOH — ECONOMIC STABILITY: FOOD INSECURITY: WITHIN THE PAST 12 MONTHS, YOU WORRIED THAT YOUR FOOD WOULD RUN OUT BEFORE YOU GOT MONEY TO BUY MORE.: NEVER TRUE

## 2022-06-20 ASSESSMENT — ENCOUNTER SYMPTOMS: COUGH: 1

## 2022-06-20 ASSESSMENT — PATIENT HEALTH QUESTIONNAIRE - PHQ9
SUM OF ALL RESPONSES TO PHQ9 QUESTIONS 1 & 2: 0
SUM OF ALL RESPONSES TO PHQ QUESTIONS 1-9: 0
SUM OF ALL RESPONSES TO PHQ QUESTIONS 1-9: 0
2. FEELING DOWN, DEPRESSED OR HOPELESS: 0
1. LITTLE INTEREST OR PLEASURE IN DOING THINGS: 0
SUM OF ALL RESPONSES TO PHQ QUESTIONS 1-9: 0
SUM OF ALL RESPONSES TO PHQ QUESTIONS 1-9: 0

## 2022-06-20 ASSESSMENT — SOCIAL DETERMINANTS OF HEALTH (SDOH): HOW HARD IS IT FOR YOU TO PAY FOR THE VERY BASICS LIKE FOOD, HOUSING, MEDICAL CARE, AND HEATING?: NOT HARD AT ALL

## 2022-06-20 NOTE — PROGRESS NOTES
Miah Verma (:  1980) is a 40 y.o. female,Established patient, here for evaluation of the following chief complaint(s):  Cough and Sinus Problem         ASSESSMENT/PLAN:  1. Acute recurrent sphenoidal sinusitis  -     azithromycin (ZITHROMAX) 250 MG tablet; Take 1 tablet by mouth See Admin Instructions for 5 days 500mg on day 1 followed by 250mg on days 2 - 5, Disp-6 tablet, R-0Normal  2. Anxiety  -     LORazepam (ATIVAN) 1 MG tablet; Take 1 tablet by mouth 2 times daily for 30 days. , Disp-60 tablet, R-0Print      Return in about 2 weeks (around 2022) for anxiety. Subjective   SUBJECTIVE/OBJECTIVE:  HPI      /89   Pulse (!) 102   Temp 97.5 °F (36.4 °C)   Ht 5' 6\" (1.676 m)   Wt 204 lb (92.5 kg)   SpO2 97%   BMI 32.93 kg/m²     Review of Systems   Respiratory: Positive for cough. Psychiatric/Behavioral: The patient is nervous/anxious. controlled with lorazepam    Objective   Physical Exam  Constitutional:       Appearance: She is obese. She is not ill-appearing. HENT:      Nose:      Comments: Sinus tenderness      Mouth/Throat:      Mouth: Mucous membranes are moist.      Pharynx: Oropharynx is clear. Cardiovascular:      Rate and Rhythm: Normal rate and regular rhythm. Heart sounds: Normal heart sounds. No murmur heard. Pulmonary:      Effort: Pulmonary effort is normal.      Breath sounds: Normal breath sounds. Neurological:      Mental Status: She is alert. An electronic signature was used to authenticate this note.     --Shamika Ventura MD

## 2022-08-28 ENCOUNTER — HOSPITAL ENCOUNTER (EMERGENCY)
Age: 42
Discharge: HOME | End: 2022-08-28
Attending: FAMILY MEDICINE
Payer: MEDICAID

## 2022-08-28 ENCOUNTER — APPOINTMENT (OUTPATIENT)
Dept: GENERAL RADIOLOGY | Age: 42
End: 2022-08-28
Payer: MEDICAID

## 2022-08-28 VITALS
SYSTOLIC BLOOD PRESSURE: 138 MMHG | HEIGHT: 66 IN | OXYGEN SATURATION: 98 % | RESPIRATION RATE: 16 BRPM | WEIGHT: 192 LBS | TEMPERATURE: 97.6 F | HEART RATE: 90 BPM | DIASTOLIC BLOOD PRESSURE: 97 MMHG | BODY MASS INDEX: 30.86 KG/M2

## 2022-08-28 DIAGNOSIS — S60.221A CONTUSION OF RIGHT HAND, INITIAL ENCOUNTER: Primary | ICD-10-CM

## 2022-08-28 PROCEDURE — 73130 X-RAY EXAM OF HAND: CPT

## 2022-08-28 PROCEDURE — 99283 EMERGENCY DEPT VISIT LOW MDM: CPT

## 2022-08-28 RX ORDER — LORAZEPAM 1 MG/1
1 TABLET ORAL EVERY 6 HOURS PRN
COMMUNITY

## 2022-08-28 NOTE — ED PROVIDER NOTES
HPI:  22,   Time: 9:51 AM EDT         Neri Ken is a 40 y.o. female presenting to the ED for injury and pain to the right hand that started this morning around 7 AM, she was at home getting ready to go to work, was opening a door but it swung back against her hand causing the injury. She went on to work, but had difficulty holding on to objects because of the pain in her hand. She complains of pain predominantly in the third and fourth fingers but also in the palm of the hand the back of the hand and in the wrist.  The pain is an aching type pain of moderate to moderate severe intensity. ROS:   Pertinent positives and negatives are stated within HPI, all other systems reviewed and are negative.  --------------------------------------------- PAST HISTORY ---------------------------------------------  Past Medical History:  has a past medical history of Abscess, ADHD (attention deficit hyperactivity disorder), Allergy to honey bee venom, Backache, Chronic obstructive lung disease ([address] Colitis, Contusion of mouth, Depressive disorder, Diabetes mellitus ([address] Gastroenteritis, Headache, Hearing loss, Hip pain, Hypertension, Hypoglycemia, Insomnia, Knee pain, Local infection of wound, Migraine, Mixed hyperlipidemia, Neuropathy, Otitis media, Pain in left foot, Pharyngitis, Pruritus of skin, Skin lesion, Strain of neck muscle, Type 2 diabetes mellitus without complication ([address] Upper respiratory infection, and Vitamin B6 deficiency. Past Surgical History:  has a past surgical history that includes Hysterectomy; Appendectomy; Cholecystectomy; and  section. Social History:  reports that she has been smoking cigarettes. She has been smoking an average of 1.5 packs per day. She has never used smokeless tobacco. She reports that she does not drink alcohol and does not use drugs. Family History: family history includes Other in her father.      The patients home medications have been reviewed. Allergies: Metformin and related and Proventil [albuterol sulfate]    -------------------------------------------------- RESULTS -------------------------------------------------  All laboratory and radiology results have been personally reviewed by myself   LABS:  No results found for this visit on 08/28/22. RADIOLOGY:  Interpreted by Radiologist.  XR HAND RIGHT (MIN 3 VIEWS)   Final Result   No fracture or dislocation.             ------------------------- NURSING NOTES AND VITALS REVIEWED ---------------------------   The nursing notes within the ED encounter and vital signs as below have been reviewed. BP (!) 138/97   Pulse 90   Temp 97.6 °F (36.4 °C) (Temporal)   Resp 16   Ht 5' 6\" (1.676 m)   Wt 192 lb (87.1 kg)   SpO2 98%   BMI 30.99 kg/m²   Oxygen Saturation Interpretation: Normal      ---------------------------------------------------PHYSICAL EXAM--------------------------------------      Constitutional/General: Alert and oriented x3, well appearing, non toxic in NAD  Head: NC/AT  Eyes: PERRL, EOMI  Mouth: Oropharynx clear, handling secretions, no trismus  Neck: Supple, full ROM, no meningeal signs  Pulmonary: Lungs clear to auscultation bilaterally, no wheezes, rales, or rhonchi. Not in respiratory distress  Cardiovascular:  Regular rate and rhythm, no murmurs, gallops, or rubs. 2+ distal pulses  Abdomen: Soft, non tender, non distended,   Extremities: Moves all extremities x 4. Warm and well perfused  Right hand: There is tenderness palpation over the proximal fingers, MCP joints, dorsum of the hand and wrist.  The pain is of moderate intensity. There is ecchymosis of the palmar aspect at the PIP joints of the second third and fourth digits. The right hand is neurovascular intact.   Skin: warm and dry without rash  Neurologic: GCS 15,  Psych: Normal Affect      ------------------------------ ED COURSE/MEDICAL DECISION MAKING----------------------  Medications - No data to display      Medical Decision Making:    Simple    Counseling: The emergency provider has spoken with the patient and discussed todays results, in addition to providing specific details for the plan of care and counseling regarding the diagnosis and prognosis. Questions are answered at this time and they are agreeable with the plan.      --------------------------------- IMPRESSION AND DISPOSITION ---------------------------------    IMPRESSION  1.  Contusion of right hand, initial encounter        DISPOSITION  Disposition: Discharge to home  Patient condition is stable                 Kate Landin MD  08/28/22 [id number]

## 2022-08-28 NOTE — Clinical Note
Nam St was seen and treated in our emergency department on 8/28/2022. She may return to work on 08/28/2022. If you have any questions or concerns, please don't hesitate to call.       Uma Yu MD

## 2022-10-04 ENCOUNTER — OFFICE VISIT (OUTPATIENT)
Dept: PRIMARY CARE CLINIC | Age: 42
End: 2022-10-04
Payer: MEDICAID

## 2022-10-04 VITALS
HEART RATE: 102 BPM | TEMPERATURE: 97.7 F | WEIGHT: 201 LBS | HEIGHT: 66 IN | BODY MASS INDEX: 32.3 KG/M2 | DIASTOLIC BLOOD PRESSURE: 88 MMHG | OXYGEN SATURATION: 97 % | SYSTOLIC BLOOD PRESSURE: 132 MMHG

## 2022-10-04 DIAGNOSIS — Z23 IMMUNIZATION DUE: ICD-10-CM

## 2022-10-04 DIAGNOSIS — E11.9 DIABETES MELLITUS WITHOUT COMPLICATION (HCC): Primary | ICD-10-CM

## 2022-10-04 LAB — HBA1C MFR BLD: 7.3 %

## 2022-10-04 PROCEDURE — 2022F DILAT RTA XM EVC RTNOPTHY: CPT | Performed by: FAMILY MEDICINE

## 2022-10-04 PROCEDURE — 4004F PT TOBACCO SCREEN RCVD TLK: CPT | Performed by: FAMILY MEDICINE

## 2022-10-04 PROCEDURE — 3051F HG A1C>EQUAL 7.0%<8.0%: CPT | Performed by: FAMILY MEDICINE

## 2022-10-04 PROCEDURE — G8482 FLU IMMUNIZE ORDER/ADMIN: HCPCS | Performed by: FAMILY MEDICINE

## 2022-10-04 PROCEDURE — G8417 CALC BMI ABV UP PARAM F/U: HCPCS | Performed by: FAMILY MEDICINE

## 2022-10-04 PROCEDURE — 83036 HEMOGLOBIN GLYCOSYLATED A1C: CPT | Performed by: FAMILY MEDICINE

## 2022-10-04 PROCEDURE — 90674 CCIIV4 VAC NO PRSV 0.5 ML IM: CPT | Performed by: FAMILY MEDICINE

## 2022-10-04 PROCEDURE — G8427 DOCREV CUR MEDS BY ELIG CLIN: HCPCS | Performed by: FAMILY MEDICINE

## 2022-10-04 PROCEDURE — 90471 IMMUNIZATION ADMIN: CPT | Performed by: FAMILY MEDICINE

## 2022-10-04 PROCEDURE — 99212 OFFICE O/P EST SF 10 MIN: CPT | Performed by: FAMILY MEDICINE

## 2022-10-04 ASSESSMENT — ENCOUNTER SYMPTOMS
ABDOMINAL PAIN: 1
DIARRHEA: 1

## 2022-10-04 NOTE — PROGRESS NOTES
Josue Schmidt (:  1980) is a 40 y.o. female,Established patient, here for evaluation of the following chief complaint(s):  Abdominal Pain (Weight gain of 9 lbs, a1c, constant bowel movements and trouble eating) and Diabetes         ASSESSMENT/PLAN:  1. Diabetes mellitus without complication ([facility] [address]  -     POCT glycosylated hemoglobin (Hb A1C)  2. Immunization due  -     Influenza, FLUCELVAX, (age 10 mo+), IM, PF, 0.5 mL    Aic 7.3    was 7.0  No follow-ups on file. To see dr Anthony Arce  go     Subjective   SUBJECTIVE/OBJECTIVE:  Abdominal Pain  Associated symptoms include diarrhea.    Diabetes    Diabetes:    Visit type  [] Initial  [x] follow-up      Diabetes type  [] type 1 [x] type 2 [] Gestational     Disease course  [x] Stable [] Improving   [] Worsening   [] fluctuating    Associated symptoms  [] none   [x] blurred vision [] foot ulcerations [] visual change  [] chest pain [] Polydipsia  [] Weakness  [] Fatigue   [] Polyphagia [] weight loss  [] foot paresthesias [] polyuria    Symptom course  [x] Stable [] Improving  [] Worsening  [] Resolved     Hypoglycemia symptoms  [x] none   [] Confusion  [] mood changes  [] Sleepiness   [] Dizziness [] nervous/anxious [] speech difficulty [] Headaches   [] Pallor [] Sweats   [] Hunger   [] seizures  [] tremors    Hypoglycemia complications      none    [] Blackouts   [] required assistance  [] Hospitalization [] required glucagon  [] nocturnal hypoglycemia    Diabetic complications  [] none   [] autonomic neuropathy [] Nephropathy [] CVA   [] peripheral neuropathy   [] heart disease [] PVD  [] Impotence [] Retinopathy      CAD risks  [] no known risk factors [] Homocysteinemia  [] post-menopausal  [] diabetes mellitus  [] Hypertension   [] sedentary lifestyle  [] Dyslipidemia  [] male sex   [] stress  [] family history  [] Obesity    [x] tobacco exposure    Current treatments  [] None [] insulin pump [] oral agent (dual therapy)  [] Diet  [] intensive insulin program  [] oral agent (triple therapy) [] insulin injections  [] oral agent (monotherapy)    Treatment compliance  [] all of the time [] most of the time [] some of the time  [] none of the time    Monitoring regimen  Blood glucose trend  [] no change  [] decreasing rapidly  [] increasing rapidly    [] fluctuating dramatically    [x] decreasing steadily  [] increasing steadily     [] fluctuating minimally    Blood glucose ranges (mg/dl)  Breakfast: [] <70  [] 70-90 []  [] 110-130  [] 130-140          []140-180  [] 180-200 [] >200    Lunch:       [] <70  [] 70-90 []  [] 110-130  [] 130-140          []140-180  [] 180-200 [] >200    Dinner:      [] <70  [] 70-90 []  [] 110-130  [] 130-140          []140-180  [] 180-200 [] >200    Bedtime:    [] <70  [] 70-90 []  [] 110-130  [] 130-140          []140-180  [] 180-200 [] >200    Overall:     [] <70  [] 70-90 []  [] 110-130  [] 130-140          []140-180  [] 180-200 [] >200    Weight trend  [x] Stable  [] decreasing rapidly  [] increasing rapidly  [] fluctuating dramatically    [] decreasing steadily  [] increasing steadily     [] fluctuating minimally    Current diet  [x] Diabetic [] high fat/cholesterol  [] low fat/cholesterol  [] low salt  [] generally healthy    [] high fiber   []  low fiber  [] Vegetarian [] generally unhealthy [] high salt     Meal planning  [] None [] avoidance of concentrated sweets [] carbohydrate counting  [] ADA exchanges  []  calorie counting    Exercise  [] Daily  [] three times a week  [x] Intermittently [] never  [] every other day [] Weekly    [] rarely    ACE-I / ARB  [] is being taken [x] is not being taken  [] is contraindicated     Eye exam current  [x] Yes  [] No      Sees podiatrist  [] Yes  [x] No              /88 (Site: Right Upper Arm, Position: Sitting, Cuff Size: Large Adult)   Pulse (!) 102   Temp 97.7 °F (36.5 °C) (Temporal)   Ht 5' 6\" (1.676 m)   Wt 201 lb (91.2 kg)   SpO2 97%   BMI 32.44 kg/m²     Review of Systems   Gastrointestinal:  Positive for abdominal pain and diarrhea. Lab Results   Component Value Date     11/09/2021    K 3.5 11/09/2021     11/09/2021    CO2 21 (L) 11/09/2021    BUN 17 11/09/2021    CREATININE 1.2 (H) 11/09/2021    GLUCOSE 153 (H) 11/09/2021    CALCIUM 9.1 11/09/2021    PROT 7.8 11/09/2021    LABALBU 4.2 11/09/2021    BILITOT 0.3 11/09/2021    ALKPHOS 120 (H) 11/09/2021    AST 20 11/09/2021    ALT 31 11/09/2021    LABGLOM 50 11/09/2021    GFRAA >60 11/09/2021     No results found for: CHOL, TRIG, HDL, LDLCHOLESTEROL, LDLCALC, LABVLDL, VLDL, CHOLHDLRATIO  Lab Results   Component Value Date    WBC 16.7 (H) 11/09/2021    HGB 15.7 (H) 11/09/2021    HCT 46.2 11/09/2021    MCV 88.7 11/09/2021     11/09/2021     No results found for: TSHFT4, TSH, TSHREFLEX, CBT4HKG  No results found for: VITD25      Objective   Physical Exam  Constitutional:       Appearance: She is obese. Abdominal:      Palpations: There is no mass. Tenderness: There is abdominal tenderness. Comments: Epigastric    Neurological:      Mental Status: She is alert. An electronic signature was used to authenticate this note.     --Ashanti Davila MD

## 2022-10-12 ENCOUNTER — OFFICE VISIT (OUTPATIENT)
Dept: PRIMARY CARE CLINIC | Age: 42
End: 2022-10-12
Payer: MEDICAID

## 2022-10-12 VITALS
TEMPERATURE: 97.6 F | OXYGEN SATURATION: 98 % | BODY MASS INDEX: 32.47 KG/M2 | WEIGHT: 202 LBS | DIASTOLIC BLOOD PRESSURE: 88 MMHG | HEART RATE: 103 BPM | SYSTOLIC BLOOD PRESSURE: 133 MMHG | HEIGHT: 66 IN

## 2022-10-12 DIAGNOSIS — R19.7 DIARRHEA, UNSPECIFIED TYPE: Primary | ICD-10-CM

## 2022-10-12 PROCEDURE — G8427 DOCREV CUR MEDS BY ELIG CLIN: HCPCS | Performed by: FAMILY MEDICINE

## 2022-10-12 PROCEDURE — G8417 CALC BMI ABV UP PARAM F/U: HCPCS | Performed by: FAMILY MEDICINE

## 2022-10-12 PROCEDURE — G8482 FLU IMMUNIZE ORDER/ADMIN: HCPCS | Performed by: FAMILY MEDICINE

## 2022-10-12 PROCEDURE — 4004F PT TOBACCO SCREEN RCVD TLK: CPT | Performed by: FAMILY MEDICINE

## 2022-10-12 PROCEDURE — 99212 OFFICE O/P EST SF 10 MIN: CPT | Performed by: FAMILY MEDICINE

## 2022-10-12 RX ORDER — LOPERAMIDE HYDROCHLORIDE 2 MG/1
2 CAPSULE ORAL 4 TIMES DAILY PRN
Qty: 120 CAPSULE | Refills: 0 | Status: SHIPPED | OUTPATIENT
Start: 2022-10-12 | End: 2022-10-22

## 2022-10-12 ASSESSMENT — ENCOUNTER SYMPTOMS
ABDOMINAL PAIN: 1
DIARRHEA: 1

## 2022-10-12 NOTE — PROGRESS NOTES
Werner Councilman (:  1980) is a 40 y.o. female,Established patient, here for evaluation of the following chief complaint(s):  Abdominal Pain (Not constipated, and having pain in her bowels all the time)         ASSESSMENT/PLAN:  1. Diarrhea, unspecified type      Return in about 1 week (around 10/19/2022) for diarrhea. Will  try  imodium      Subjective   SUBJECTIVE/OBJECTIVE:  Abdominal Pain  Associated symptoms include diarrhea. diarrhea       /88 (Site: Right Upper Arm, Position: Sitting, Cuff Size: Large Adult)   Pulse (!) 103   Temp 97.6 °F (36.4 °C) (Temporal)   Ht 5' 6\" (1.676 m)   Wt 202 lb (91.6 kg)   SpO2 98%   BMI 32.60 kg/m²     Review of Systems   Gastrointestinal:  Positive for abdominal pain and diarrhea. Lab Results   Component Value Date     2021    K 3.5 2021     2021    CO2 21 (L) 2021    BUN 17 2021    CREATININE 1.2 (H) 2021    GLUCOSE 153 (H) 2021    CALCIUM 9.1 2021    PROT 7.8 2021    LABALBU 4.2 2021    BILITOT 0.3 2021    ALKPHOS 120 (H) 2021    AST 20 2021    ALT 31 2021    LABGLOM 50 2021    GFRAA >60 2021     No results found for: CHOL, TRIG, HDL, LDLCHOLESTEROL, LDLCALC, LABVLDL, VLDL, CHOLHDLRATIO  Lab Results   Component Value Date    WBC 16.7 (H) 2021    HGB 15.7 (H) 2021    HCT 46.2 2021    MCV 88.7 2021     2021     No results found for: TSHFT4, TSH, TSHREFLEX, UBN8GPO  No results found for: VITD25      Objective   Physical Exam  Constitutional:       Appearance: Normal appearance. Cardiovascular:      Rate and Rhythm: Normal rate and regular rhythm. Pulmonary:      Effort: Pulmonary effort is normal.      Breath sounds: Normal breath sounds. Abdominal:      General: Abdomen is flat. Bowel sounds are normal.      Tenderness: There is abdominal tenderness.       Comments: suprapubic   Neurological: Mental Status: She is alert. Psychiatric:         Mood and Affect: Mood normal.                    An electronic signature was used to authenticate this note. --Alexandro Neely MD   Having diarrhea for 2 weeks. 6 plus bm per day   .  Gassy  suprapubic pain      will try  immodium     rto one  week  may need atropine

## 2023-02-07 RX ORDER — BLOOD SUGAR DIAGNOSTIC
STRIP MISCELLANEOUS
Qty: 200 STRIP | Refills: 1 | Status: SHIPPED | OUTPATIENT
Start: 2023-02-07

## 2023-07-07 ENCOUNTER — APPOINTMENT (OUTPATIENT)
Dept: GENERAL RADIOLOGY | Age: 43
End: 2023-07-07
Payer: MEDICAID

## 2023-07-07 ENCOUNTER — HOSPITAL ENCOUNTER (EMERGENCY)
Age: 43
Discharge: HOME | End: 2023-07-07
Attending: FAMILY MEDICINE
Payer: MEDICAID

## 2023-07-07 VITALS
TEMPERATURE: 98.1 F | WEIGHT: 176 LBS | OXYGEN SATURATION: 98 % | SYSTOLIC BLOOD PRESSURE: 109 MMHG | HEIGHT: 66 IN | DIASTOLIC BLOOD PRESSURE: 74 MMHG | BODY MASS INDEX: 28.28 KG/M2 | RESPIRATION RATE: 16 BRPM

## 2023-07-07 DIAGNOSIS — M94.0 COSTOCHONDRITIS, ACUTE: Primary | ICD-10-CM

## 2023-07-07 PROCEDURE — 96372 THER/PROPH/DIAG INJ SC/IM: CPT

## 2023-07-07 PROCEDURE — 71100 X-RAY EXAM RIBS UNI 2 VIEWS: CPT

## 2023-07-07 PROCEDURE — 99284 EMERGENCY DEPT VISIT MOD MDM: CPT

## 2023-07-07 PROCEDURE — 6360000002 HC RX W HCPCS: Performed by: FAMILY MEDICINE

## 2023-07-07 RX ORDER — DULAGLUTIDE 1.5 MG/.5ML
INJECTION, SOLUTION SUBCUTANEOUS
COMMUNITY
Start: 2023-06-15

## 2023-07-07 RX ORDER — LISINOPRIL 2.5 MG/1
2.5 TABLET ORAL DAILY
COMMUNITY
Start: 2023-06-29

## 2023-07-07 RX ORDER — ROSUVASTATIN CALCIUM 10 MG/1
10 TABLET, COATED ORAL DAILY
COMMUNITY
Start: 2023-06-29

## 2023-07-07 RX ORDER — KETOROLAC TROMETHAMINE 30 MG/ML
60 INJECTION, SOLUTION INTRAMUSCULAR; INTRAVENOUS ONCE
Status: COMPLETED | OUTPATIENT
Start: 2023-07-07 | End: 2023-07-07

## 2023-07-07 RX ADMIN — KETOROLAC TROMETHAMINE 60 MG: 30 INJECTION, SOLUTION INTRAMUSCULAR at 13:06

## 2023-07-07 ASSESSMENT — PAIN SCALES - GENERAL: PAINLEVEL_OUTOF10: 7

## 2023-07-07 ASSESSMENT — PAIN - FUNCTIONAL ASSESSMENT: PAIN_FUNCTIONAL_ASSESSMENT: 0-10

## 2023-07-07 ASSESSMENT — PAIN DESCRIPTION - LOCATION: LOCATION: RIB CAGE

## 2023-07-07 ASSESSMENT — PAIN DESCRIPTION - ORIENTATION: ORIENTATION: LEFT

## 2024-01-30 VITALS — HEART RATE: 102 BPM | OXYGEN SATURATION: 98 % | TEMPERATURE: 97.1 F

## 2024-01-31 ENCOUNTER — HOSPITAL ENCOUNTER (EMERGENCY)
Age: 44
Discharge: HOME | End: 2024-01-31

## 2024-10-29 ENCOUNTER — HOSPITAL ENCOUNTER (EMERGENCY)
Age: 44
Discharge: HOME | End: 2024-10-29
Attending: FAMILY MEDICINE

## 2024-10-29 VITALS
RESPIRATION RATE: 18 BRPM | DIASTOLIC BLOOD PRESSURE: 96 MMHG | HEIGHT: 67 IN | BODY MASS INDEX: 29.51 KG/M2 | SYSTOLIC BLOOD PRESSURE: 127 MMHG | TEMPERATURE: 97 F | WEIGHT: 188 LBS | OXYGEN SATURATION: 97 % | HEART RATE: 107 BPM

## 2024-10-29 DIAGNOSIS — S09.90XA INJURY OF HEAD, INITIAL ENCOUNTER: Primary | ICD-10-CM

## 2024-10-29 DIAGNOSIS — R51.9 NONINTRACTABLE HEADACHE, UNSPECIFIED CHRONICITY PATTERN, UNSPECIFIED HEADACHE TYPE: ICD-10-CM

## 2024-10-29 PROCEDURE — 96372 THER/PROPH/DIAG INJ SC/IM: CPT

## 2024-10-29 PROCEDURE — 99284 EMERGENCY DEPT VISIT MOD MDM: CPT

## 2024-10-29 PROCEDURE — 6360000002 HC RX W HCPCS: Performed by: FAMILY MEDICINE

## 2024-10-29 RX ORDER — KETOROLAC TROMETHAMINE 30 MG/ML
30 INJECTION, SOLUTION INTRAMUSCULAR; INTRAVENOUS ONCE
Status: COMPLETED | OUTPATIENT
Start: 2024-10-29 | End: 2024-10-29

## 2024-10-29 RX ORDER — CYCLOBENZAPRINE HCL 5 MG
5 TABLET ORAL 3 TIMES DAILY PRN
Qty: 10 TABLET | Refills: 0 | Status: SHIPPED | OUTPATIENT
Start: 2024-10-29 | End: 2024-11-08

## 2024-10-29 RX ORDER — IBUPROFEN 600 MG/1
600 TABLET, FILM COATED ORAL 3 TIMES DAILY PRN
Qty: 30 TABLET | Refills: 0 | Status: SHIPPED | OUTPATIENT
Start: 2024-10-29

## 2024-10-29 RX ADMIN — KETOROLAC TROMETHAMINE 30 MG: 30 INJECTION, SOLUTION INTRAMUSCULAR at 09:40

## 2024-10-29 ASSESSMENT — PAIN DESCRIPTION - DESCRIPTORS: DESCRIPTORS: DISCOMFORT;ACHING

## 2024-10-29 ASSESSMENT — PAIN DESCRIPTION - LOCATION: LOCATION: HEAD

## 2024-10-29 ASSESSMENT — PAIN DESCRIPTION - FREQUENCY: FREQUENCY: CONTINUOUS

## 2024-10-29 ASSESSMENT — PAIN - FUNCTIONAL ASSESSMENT
PAIN_FUNCTIONAL_ASSESSMENT: PREVENTS OR INTERFERES SOME ACTIVE ACTIVITIES AND ADLS
PAIN_FUNCTIONAL_ASSESSMENT: 0-10

## 2024-10-29 ASSESSMENT — PAIN SCALES - GENERAL: PAINLEVEL_OUTOF10: 7

## 2024-10-29 ASSESSMENT — PAIN DESCRIPTION - PAIN TYPE: TYPE: ACUTE PAIN

## 2024-10-29 NOTE — ED PROVIDER NOTES
Urgent Care Encounter       Patient: Shelbi Donahue  MRN: [id number]  : 1980  Date of Evaluation: 10/29/2024  ED Provider: Lary Darling MD    Chief Complaint       Chief Complaint   Patient presents with    Head Injury     KATARINA Donahue is a 44 y.o. female who presents to the [facility] (Urgent care Facility).    Patient hit her head yesterday  8:15 AM while pulling things from the bottom of her seat in her car her head impact injury was on the left sided frontal temporal region that was at 8:15 AM in the morning.  Since then she has had a left sided headache that has not been intractable however she states the pain has been a 7 out of 10, she has taken some ibuprofen 800 mg 1 dose that she took yesterday at 10 AM in the 10 AM yesterday apart from that she did not take any other headache medication throughout yesterday and throughout this morning.  She presents today with the persistent headache at 7 out of 10 on the left sided frontal temporal region.  She does not have any visual problems she does not have any other neurological complaints.    -Patient does not report this as being the worst headache they have had, patient is cognitively alert, no fevers noted, no persistent visual problems as such, no black outs in the visual field, no nausea/vomiting , no neck pain, no neck stiffness, no Kernig’s sign on exam, no Brudzinski sign on exam, no prior history of blood clots, patient is not on any drugs that increase likelihood of blood clots, no signs or symptoms of neurological deficit that suggests more dire symptoms like TIA/Stroke, Meningitis, Cavernous sinus thrombosis or brain tumor. Patient does not report any jaw pain or temple pain or visual problems that make me think of Temporal arteritis.     I have advised patient that if they develop any red flag signs as mentioned in this paragraph , then to reach to seek medical attention, immediately get

## 2025-08-21 VITALS
TEMPERATURE: 98.1 F | SYSTOLIC BLOOD PRESSURE: 101 MMHG | OXYGEN SATURATION: 96 % | DIASTOLIC BLOOD PRESSURE: 79 MMHG | RESPIRATION RATE: 18 BRPM | HEART RATE: 109 BPM

## 2025-08-22 ENCOUNTER — HOSPITAL ENCOUNTER (EMERGENCY)
Age: 45
Discharge: LEFT BEFORE DISCHARGE | End: 2025-08-22

## 2025-08-22 ENCOUNTER — HOSPITAL ENCOUNTER (EMERGENCY)
Facility: HOSPITAL | Age: 45
Discharge: HOME | End: 2025-08-22
Attending: STUDENT IN AN ORGANIZED HEALTH CARE EDUCATION/TRAINING PROGRAM
Payer: COMMERCIAL

## 2025-08-22 ENCOUNTER — APPOINTMENT (OUTPATIENT)
Dept: CARDIOLOGY | Facility: HOSPITAL | Age: 45
End: 2025-08-22
Payer: COMMERCIAL

## 2025-08-22 ENCOUNTER — APPOINTMENT (OUTPATIENT)
Dept: RADIOLOGY | Facility: HOSPITAL | Age: 45
End: 2025-08-22
Payer: COMMERCIAL

## 2025-08-22 ASSESSMENT — PAIN SCALES - GENERAL
PAINLEVEL_OUTOF10: 10 - WORST POSSIBLE PAIN
PAINLEVEL_OUTOF10: 3

## 2025-08-22 ASSESSMENT — LIFESTYLE VARIABLES
HAVE YOU EVER FELT YOU SHOULD CUT DOWN ON YOUR DRINKING: NO
HAVE PEOPLE ANNOYED YOU BY CRITICIZING YOUR DRINKING: NO
EVER HAD A DRINK FIRST THING IN THE MORNING TO STEADY YOUR NERVES TO GET RID OF A HANGOVER: NO
EVER FELT BAD OR GUILTY ABOUT YOUR DRINKING: NO
TOTAL SCORE: 0

## 2025-08-22 ASSESSMENT — PAIN - FUNCTIONAL ASSESSMENT
PAIN_FUNCTIONAL_ASSESSMENT: 0-10
PAIN_FUNCTIONAL_ASSESSMENT: 0-10